# Patient Record
Sex: MALE | Race: WHITE | Employment: STUDENT | URBAN - NONMETROPOLITAN AREA
[De-identification: names, ages, dates, MRNs, and addresses within clinical notes are randomized per-mention and may not be internally consistent; named-entity substitution may affect disease eponyms.]

---

## 2019-02-17 ENCOUNTER — HOSPITAL ENCOUNTER (EMERGENCY)
Facility: OTHER | Age: 29
Discharge: HOME | End: 2019-02-17
Attending: PHYSICIAN ASSISTANT | Admitting: PHYSICIAN ASSISTANT
Payer: COMMERCIAL

## 2019-02-17 VITALS
RESPIRATION RATE: 16 BRPM | WEIGHT: 160 LBS | SYSTOLIC BLOOD PRESSURE: 134 MMHG | BODY MASS INDEX: 21.11 KG/M2 | TEMPERATURE: 98.3 F | DIASTOLIC BLOOD PRESSURE: 91 MMHG | OXYGEN SATURATION: 99 %

## 2019-02-17 DIAGNOSIS — K04.7 DENTAL ABSCESS: ICD-10-CM

## 2019-02-17 PROCEDURE — 41800 DRAINAGE OF GUM LESION: CPT | Mod: Z6 | Performed by: PHYSICIAN ASSISTANT

## 2019-02-17 PROCEDURE — 99282 EMERGENCY DEPT VISIT SF MDM: CPT | Mod: 25 | Performed by: PHYSICIAN ASSISTANT

## 2019-02-17 PROCEDURE — 25000125 ZZHC RX 250: Performed by: PHYSICIAN ASSISTANT

## 2019-02-17 PROCEDURE — 96372 THER/PROPH/DIAG INJ SC/IM: CPT | Mod: XU | Performed by: PHYSICIAN ASSISTANT

## 2019-02-17 PROCEDURE — 25000128 H RX IP 250 OP 636: Performed by: PHYSICIAN ASSISTANT

## 2019-02-17 PROCEDURE — 99284 EMERGENCY DEPT VISIT MOD MDM: CPT | Mod: 25 | Performed by: PHYSICIAN ASSISTANT

## 2019-02-17 PROCEDURE — 25000132 ZZH RX MED GY IP 250 OP 250 PS 637: Performed by: PHYSICIAN ASSISTANT

## 2019-02-17 PROCEDURE — 41800 DRAINAGE OF GUM LESION: CPT | Performed by: PHYSICIAN ASSISTANT

## 2019-02-17 RX ORDER — KETOROLAC TROMETHAMINE 30 MG/ML
30 INJECTION, SOLUTION INTRAMUSCULAR; INTRAVENOUS ONCE
Status: COMPLETED | OUTPATIENT
Start: 2019-02-17 | End: 2019-02-17

## 2019-02-17 RX ORDER — CLINDAMYCIN HCL 300 MG
300 CAPSULE ORAL 4 TIMES DAILY
Qty: 40 CAPSULE | Refills: 0 | Status: SHIPPED | OUTPATIENT
Start: 2019-02-17 | End: 2019-07-12

## 2019-02-17 RX ORDER — CLINDAMYCIN HCL 150 MG
450 CAPSULE ORAL ONCE
Status: COMPLETED | OUTPATIENT
Start: 2019-02-17 | End: 2019-02-17

## 2019-02-17 RX ADMIN — CLINDAMYCIN HYDROCHLORIDE 450 MG: 150 CAPSULE ORAL at 22:24

## 2019-02-17 RX ADMIN — LIDOCAINE HYDROCHLORIDE 10 ML: 10 INJECTION, SOLUTION EPIDURAL; INFILTRATION; INTRACAUDAL; PERINEURAL at 22:01

## 2019-02-17 RX ADMIN — KETOROLAC TROMETHAMINE 30 MG: 30 INJECTION, SOLUTION INTRAMUSCULAR at 22:25

## 2019-02-17 NOTE — ED AVS SNAPSHOT
[facility]  [address]  [place] [address]  Phone:  [phone]  Fax:  [phone]                                    Yuniel Palm   MRN: [id number]    Department:  [facility]   Date of Visit:  2/17/2019           After Visit Summary Signature Page    I have received my discharge instructions, and my questions have been answered. I have discussed any challenges I see with this plan with the nurse or doctor.    ..........................................................................................................................................  Patient/Patient Representative Signature      ..........................................................................................................................................  Patient Representative Print Name and Relationship to Patient    ..................................................               ................................................  Date                                   Time    ..........................................................................................................................................  Reviewed by Signature/Title    ...................................................              ..............................................  Date                                               Time          22EPIC Rev 08/18

## 2019-02-18 NOTE — ED TRIAGE NOTES
"Pt arrives to the ED via private car.  Pt states that his tooth was bothering him today when waking him up from a nap.  Pt states his back right molar was bothering him and there was a \"pus pocket\" in between his tooth and gum that moved around.  Pt states he took a clean nail and poked the pus pocket and white/yellow and a little bit of blood was in there.  Pt states his pain is a 10/10 right now.  "

## 2019-02-18 NOTE — DISCHARGE INSTRUCTIONS
Get plenty of fluids and rest.  Take your medication as prescribed.  You can take Tylenol ibuprofen as needed for discomfort.  Call make an appointment with a dentist in the morning.  Return to the ED if your symptoms are worsening such as increased swelling, difficulty swallowing liquids or your own saliva.

## 2019-02-22 ASSESSMENT — ENCOUNTER SYMPTOMS
BACK PAIN: 0
SHORTNESS OF BREATH: 0
ABDOMINAL PAIN: 0
ADENOPATHY: 0
FEVER: 0
HEMATURIA: 0
BRUISES/BLEEDS EASILY: 0
CONFUSION: 0
CHEST TIGHTNESS: 0
WOUND: 0
CHILLS: 0

## 2019-02-22 NOTE — ED PROVIDER NOTES
History   No chief complaint on file.    HPI  Yuniel Palm is a 28 year old male who presents to the ED with a chief complaint of a dental problem. After taking a nap this afternoon pt reports having a pus pocket around his right molar. Pt popped it open with a nail with yellow discharge. Pain is 10/10. No airway/respiratory distress.    Allergies:  Allergies   Allergen Reactions     Penicillins Hives       Problem List:    There are no active problems to display for this patient.       Past Medical History:    History reviewed. No pertinent past medical history.    Past Surgical History:    Past Surgical History:   Procedure Laterality Date     APPENDECTOMY  1999       Family History:    History reviewed. No pertinent family history.    Social History:  Marital Status:  Single [1]  Social History     Tobacco Use     Smoking status: Current Every Day Smoker     Packs/day: 0.25     Smokeless tobacco: Never Used   Substance Use Topics     Alcohol use: Yes     Drug use: No        Medications:      clindamycin (CLEOCIN) 300 MG capsule         Review of Systems   Constitutional: Negative for chills and fever.   HENT: Positive for dental problem. Negative for congestion.    Eyes: Negative for visual disturbance.   Respiratory: Negative for chest tightness and shortness of breath.    Cardiovascular: Negative for chest pain.   Gastrointestinal: Negative for abdominal pain.   Genitourinary: Negative for hematuria.   Musculoskeletal: Negative for back pain.   Skin: Negative for rash and wound.   Neurological: Negative for syncope.   Hematological: Negative for adenopathy. Does not bruise/bleed easily.   Psychiatric/Behavioral: Negative for confusion.       Physical Exam   BP: (!) 134/91  Heart Rate: 83  Temp: 98.3  F (36.8  C)  Resp: 16  Weight: 72.6 kg (160 lb)  SpO2: 99 %      Physical Exam   Constitutional: He appears well-developed and well-nourished. No distress.   HENT:   Head: Normocephalic and atraumatic.    Swelling on gum line right lower   Eyes: Conjunctivae are normal. No scleral icterus.   Neck: Neck supple.   Cardiovascular: Normal rate and regular rhythm.   Pulmonary/Chest: Effort normal.   Abdominal: Soft. There is no tenderness.   Musculoskeletal: He exhibits no deformity.   Lymphadenopathy:     He has no cervical adenopathy.   Neurological: He is alert.   Skin: Skin is warm and dry. No rash noted. He is not diaphoretic.   Psychiatric: He has a normal mood and affect.       ED Course        Procedures               Critical Care time:  none               No results found for this or any previous visit (from the past 24 hour(s)).    Medications   lidocaine 1 % 10 mL (10 mLs Other Given by Other Clinician 2/17/19 2201)   ketorolac (TORADOL) injection 30 mg (30 mg Intramuscular Given 2/17/19 2225)   clindamycin (CLEOCIN) capsule 450 mg (450 mg Oral Given 2/17/19 2224)       Assessments & Plan (with Medical Decision Making)   Pt nontoxic, in NAD. No signs of airway compromise. Poor dentition. Abscess seen right lower gum line. Inferior alveolar block placed, I&D. Purulent drainage, pt tolerated well. Pt placed on clindamycin, given toradol, f/u with dentist. Strict return precautions given, pt understood, and was discharged.     Juanjo Fisher PA-C    I have reviewed the nursing notes.    I have reviewed the findings, diagnosis, plan and need for follow up with the patient.          Medication List      Started    clindamycin 300 MG capsule  Commonly known as:  CLEOCIN  300 mg, Oral, 4 TIMES DAILY            Final diagnoses:   Dental abscess       2/17/2019   [facility]     Juanjo Fisher PA  02/22/19 8831

## 2019-07-12 ENCOUNTER — OFFICE VISIT (OUTPATIENT)
Dept: FAMILY MEDICINE | Facility: OTHER | Age: 29
End: 2019-07-12
Attending: NURSE PRACTITIONER
Payer: MEDICAID

## 2019-07-12 VITALS
HEIGHT: 72 IN | BODY MASS INDEX: 23.89 KG/M2 | WEIGHT: 176.4 LBS | TEMPERATURE: 98.4 F | HEART RATE: 86 BPM | RESPIRATION RATE: 18 BRPM | SYSTOLIC BLOOD PRESSURE: 122 MMHG | DIASTOLIC BLOOD PRESSURE: 86 MMHG | OXYGEN SATURATION: 98 %

## 2019-07-12 DIAGNOSIS — F98.8 ATTENTION DEFICIT DISORDER, UNSPECIFIED HYPERACTIVITY PRESENCE: ICD-10-CM

## 2019-07-12 DIAGNOSIS — F19.10 IV DRUG ABUSE (H): ICD-10-CM

## 2019-07-12 DIAGNOSIS — B18.2 CHRONIC HEPATITIS C WITHOUT HEPATIC COMA (H): ICD-10-CM

## 2019-07-12 DIAGNOSIS — Z00.00 ROUTINE GENERAL MEDICAL EXAMINATION AT A HEALTH CARE FACILITY: Primary | ICD-10-CM

## 2019-07-12 DIAGNOSIS — F10.10 ALCOHOL ABUSE: ICD-10-CM

## 2019-07-12 DIAGNOSIS — F33.2 SEVERE EPISODE OF RECURRENT MAJOR DEPRESSIVE DISORDER, WITHOUT PSYCHOTIC FEATURES (H): ICD-10-CM

## 2019-07-12 DIAGNOSIS — F41.1 GAD (GENERALIZED ANXIETY DISORDER): ICD-10-CM

## 2019-07-12 DIAGNOSIS — F17.200 TOBACCO DEPENDENCE: ICD-10-CM

## 2019-07-12 PROBLEM — R79.82 ELEVATED C-REACTIVE PROTEIN (CRP): Status: ACTIVE | Noted: 2018-11-08

## 2019-07-12 PROBLEM — R74.01 TRANSAMINITIS: Status: ACTIVE | Noted: 2018-11-08

## 2019-07-12 PROBLEM — R00.0 TACHYCARDIA: Status: ACTIVE | Noted: 2018-11-08

## 2019-07-12 PROBLEM — M25.461 PAIN AND SWELLING OF RIGHT KNEE: Status: ACTIVE | Noted: 2018-11-08

## 2019-07-12 PROBLEM — M25.561 PAIN AND SWELLING OF RIGHT KNEE: Status: ACTIVE | Noted: 2018-11-08

## 2019-07-12 PROCEDURE — G0463 HOSPITAL OUTPT CLINIC VISIT: HCPCS

## 2019-07-12 PROCEDURE — 99395 PREV VISIT EST AGE 18-39: CPT | Performed by: NURSE PRACTITIONER

## 2019-07-12 RX ORDER — CLINDAMYCIN HCL 300 MG
CAPSULE ORAL
Refills: 0 | COMMUNITY
Start: 2019-07-10 | End: 2024-09-23

## 2019-07-12 ASSESSMENT — MIFFLIN-ST. JEOR: SCORE: 1805.21

## 2019-07-12 ASSESSMENT — ANXIETY QUESTIONNAIRES
1. FEELING NERVOUS, ANXIOUS, OR ON EDGE: NOT AT ALL
6. BECOMING EASILY ANNOYED OR IRRITABLE: NOT AT ALL
3. WORRYING TOO MUCH ABOUT DIFFERENT THINGS: NOT AT ALL
5. BEING SO RESTLESS THAT IT IS HARD TO SIT STILL: NOT AT ALL
GAD7 TOTAL SCORE: 0
2. NOT BEING ABLE TO STOP OR CONTROL WORRYING: NOT AT ALL
7. FEELING AFRAID AS IF SOMETHING AWFUL MIGHT HAPPEN: NOT AT ALL
IF YOU CHECKED OFF ANY PROBLEMS ON THIS QUESTIONNAIRE, HOW DIFFICULT HAVE THESE PROBLEMS MADE IT FOR YOU TO DO YOUR WORK, TAKE CARE OF THINGS AT HOME, OR GET ALONG WITH OTHER PEOPLE: NOT DIFFICULT AT ALL

## 2019-07-12 ASSESSMENT — PATIENT HEALTH QUESTIONNAIRE - PHQ9: 5. POOR APPETITE OR OVEREATING: NOT AT ALL

## 2019-07-12 ASSESSMENT — PAIN SCALES - GENERAL: PAINLEVEL: NO PAIN (0)

## 2019-07-12 NOTE — PROGRESS NOTES
SUBJECTIVE:   CC: Yuniel Palm is an 29 year old male who presents for preventive health visit.     Healthy Habits:    Do you get at least three servings of calcium containing foods daily (dairy, green leafy vegetables, etc.)? yes    Amount of exercise or daily activities, outside of work: a lot; walks about 3 miles a day, works out for 45 minutes with arms and core strengthening through lifting    Problems taking medications regularly No    Medication side effects: No    Have you had an eye exam in the past two years? Yes  - yesterday    Do you see a dentist twice per year? Saw yesterday, started on antibiotics for infection    Do you have sleep apnea, excessive snoring or daytime drowsiness?no      Substance abuse  Currently residing at [facility] for court ordered alcohol treatment. Came on 6/11/19, was supposed to be leaving today though is working on getting out this weekend. Has been in treatment for alcohol in 2017, relapsed in 2018 as more of a weekend issue. Would typically drink a pint on a Friday, wake up hung over on Saturday and then plan on going out again on Sunday. Typically would be only when SO was leaving town to visit family. Is a probation violation, which then required him to return to treatment. 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 Did have positive Hep C antibody in the past, Hep C RNA was negative.     Today's PHQ-2 Score:   PHQ-2 ( 1999 Pfizer) 7/12/2019   Q1: Little interest or pleasure in doing things 0   Q2: Feeling down, depressed or hopeless 0   PHQ-2 Score 0       Abuse: Current or Past(Physical, Sexual or Emotional)- No  Do you feel safe in your environment? Yes    Social History     Tobacco Use     Smoking status: Current Every Day Smoker     Packs/day: 0.25     Years: 5.00     Pack years: 1.25     Smokeless tobacco: Never Used   Substance Use Topics     Alcohol use: Yes     Comment: 1 pint liquor weekly; sober since  February 20th 2019     If you drink alcohol do you typically have >3 drinks per day or >7 drinks per week? No                      Last PSA: No results found for: PSA    Reviewed orders with patient. Reviewed health maintenance and updated orders accordingly - Yes  Labs reviewed in EPIC  BP Readings from Last 3 Encounters:   07/12/19 122/86   02/17/19 (!) 134/91   01/22/14 120/78    Wt Readings from Last 3 Encounters:   07/12/19 80 kg (176 lb 6.4 oz)   02/17/19 72.6 kg (160 lb)   01/22/14 74.8 kg (165 lb)                  Patient Active Problem List   Diagnosis     Attention deficit disorder     Chronic hepatitis C without hepatic coma (H)     Elevated C-reactive protein (CRP)     AYLIN (generalized anxiety disorder)     IV drug abuse (H)     Pain and swelling of right knee     Severe episode of recurrent major depressive disorder, without psychotic features (H)     Tachycardia     Tobacco dependence     Transaminitis     Past Surgical History:   Procedure Laterality Date     APPENDECTOMY  1999       Social History     Tobacco Use     Smoking status: Current Every Day Smoker     Packs/day: 0.25     Years: 5.00     Pack years: 1.25     Smokeless tobacco: Never Used   Substance Use Topics     Alcohol use: Yes     Comment: 1 pint liquor weekly; sober since February 20th 2019     Family History   Problem Relation Age of Onset     Diabetes Mother      Heart Disease Mother      Hypertension Mother      Hyperlipidemia Mother      Kidney Disease Mother      Depression Mother      Substance Abuse Mother      Dementia Mother      Unknown/Adopted Father      Unknown/Adopted Maternal Grandmother          Current Outpatient Medications   Medication Sig Dispense Refill     clindamycin (CLEOCIN) 300 MG capsule TAKE 1 CAPSULE BY MOUTH THREE TIMES DAILY TO COMPLETION  0     Allergies   Allergen Reactions     Penicillins Hives       Reviewed and updated as needed this visit by clinical staff  Tobacco  Allergies  Meds  Problems  Med  "Hx  Surg Hx  Fam Hx  Soc Hx          Reviewed and updated as needed this visit by Provider  Tobacco  Allergies  Meds  Med Hx  Surg Hx  Fam Hx  Soc Hx           ROS:  CONSTITUTIONAL: NEGATIVE for fever, chills, change in weight  INTEGUMENTARY/SKIN: NEGATIVE for worrisome rashes, moles or lesions  EYES: NEGATIVE for vision changes or irritation  ENT: POSITIVE for tooth pain  RESP: NEGATIVE for significant cough or SOB  CV: NEGATIVE for chest pain, palpitations or peripheral edema  GI: NEGATIVE for nausea, abdominal pain, heartburn, or change in bowel habits   male: negative for dysuria, hematuria, decreased urinary stream, erectile dysfunction, urethral discharge  MUSCULOSKELETAL: NEGATIVE for significant arthralgias or myalgia  NEURO: NEGATIVE for weakness, dizziness or paresthesias  ENDOCRINE: NEGATIVE for temperature intolerance, skin/hair changes  HEME/ALLERGY/IMMUNE: NEGATIVE for bleeding problems  PSYCHIATRIC: NEGATIVE for changes in mood or affect    OBJECTIVE:   /86   Pulse 86   Temp 98.4  F (36.9  C) (Temporal)   Resp 18   Ht 1.816 m (5' 11.5\")   Wt 80 kg (176 lb 6.4 oz)   SpO2 98%   BMI 24.26 kg/m    EXAM:  GENERAL: healthy, alert and no distress  EYES: Eyes grossly normal to inspection, PERRL and conjunctivae and sclerae normal  HENT: ear canals and TM's normal, nose and mouth without ulcers or lesions  NECK: no adenopathy, no asymmetry, masses, or scars and thyroid normal to palpation  RESP: lungs clear to auscultation - no rales, rhonchi or wheezes  CV: regular rate and rhythm, normal S1 S2, no S3 or S4, no murmur, click or rub, no peripheral edema and peripheral pulses strong  ABDOMEN: soft, nontender, no hepatosplenomegaly, no masses and bowel sounds normal  MS: no gross musculoskeletal defects noted, no edema  SKIN: no suspicious lesions or rashes  NEURO: Normal strength and tone, mentation intact and speech normal  PSYCH: mentation appears normal, affect " "normal/bright    Diagnostic Test Results:  Labs reviewed in Epic  Declined today, is having some done in the next day or two at new living facility    ASSESSMENT/PLAN:   1. Routine general medical examination at a health care facility  2. Alcohol abuse  3.  IV drug abuse (H)  4. Chronic hepatitis C without hepatic coma (H)  Currently remains at [facility] for court ordered inpatient alcohol treatment, though is moving tomorrow as he has already completed stay here and has done well. Has no concerns for ongoing treatment at this time, is looking forward to settling back at home with SO eventually. Has been sober from IV meth use since 2017 and doing well with this. Declined labs today as he is moving to a new facility and will have to have labs completed there.     5. Severe episode of recurrent major depressive disorder, without psychotic features (H)  6. AYLIN (generalized anxiety disorder)  7. Attention deficit disorder, unspecified hyperactivity presence  Ongoing, though stable without medications, doing well.     8. Tobacco dependence  Ongoing, not interested in tobacco cessation at this time.       COUNSELING:  Reviewed preventive health counseling, as reflected in patient instructions       Regular exercise       Healthy diet/nutrition       Vision screening       Hearing screening       Family planning       Safe sex practices/STD prevention       Consider Hep C screening for patients born between 1945 and 1965       HIV screeninx in teen years, 1x in adult years, and at intervals if high risk    Estimated body mass index is 24.26 kg/m  as calculated from the following:    Height as of this encounter: 1.816 m (5' 11.5\").    Weight as of this encounter: 80 kg (176 lb 6.4 oz).         reports that he has been smoking.  He has a 1.25 pack-year smoking history. He has never used smokeless tobacco.  Tobacco Cessation Action Plan: Information offered: Patient not interested at this " time    Counseling Resources:  ATP IV Guidelines  Pooled Cohorts Equation Calculator  FRAX Risk Assessment  ICSI Preventive Guidelines  Dietary Guidelines for Americans, 2010  USDA's MyPlate  ASA Prophylaxis  Lung CA Screening    Liyah Rodas NP  [facility]

## 2019-07-12 NOTE — NURSING NOTE
"Chief Complaint   Patient presents with     Physical     [place] intake px     Currently in treatment for Alcohol and Methamphetamine use.    Initial /86   Pulse 86   Temp 98.4  F (36.9  C) (Temporal)   Resp 18   Ht 1.816 m (5' 11.5\")   Wt 80 kg (176 lb 6.4 oz)   SpO2 98%   BMI 24.26 kg/m   Estimated body mass index is 24.26 kg/m  as calculated from the following:    Height as of this encounter: 1.816 m (5' 11.5\").    Weight as of this encounter: 80 kg (176 lb 6.4 oz).    Medication Reconciliation: complete      Norma J. Gosselin, LPN  "

## 2019-07-13 ASSESSMENT — ANXIETY QUESTIONNAIRES: GAD7 TOTAL SCORE: 0

## 2019-07-15 PROBLEM — F10.10 ALCOHOL ABUSE: Status: ACTIVE | Noted: 2019-07-12

## 2020-05-20 ENCOUNTER — PATIENT OUTREACH (OUTPATIENT)
Dept: CARE COORDINATION | Facility: CLINIC | Age: 30
End: 2020-05-20

## 2020-05-20 NOTE — PROGRESS NOTES
Clinic Care Coordination Contact  [facility]       Clinical Data: Care Coordinator Outreach  Outreach attempted x 1. No working personal phone number, may be in inpatient treatment right now. Maritza Gibbs RN on 5/20/2020 at 11:30 AM

## 2024-09-23 ENCOUNTER — HOSPITAL ENCOUNTER (EMERGENCY)
Facility: HOSPITAL | Age: 34
Discharge: HOME | End: 2024-09-23
Attending: NURSE PRACTITIONER
Payer: COMMERCIAL

## 2024-09-23 VITALS
DIASTOLIC BLOOD PRESSURE: 87 MMHG | SYSTOLIC BLOOD PRESSURE: 136 MMHG | HEART RATE: 98 BPM | TEMPERATURE: 99.2 F | RESPIRATION RATE: 18 BRPM | OXYGEN SATURATION: 98 %

## 2024-09-23 DIAGNOSIS — L23.7 CONTACT DERMATITIS DUE TO POISON IVY: Primary | ICD-10-CM

## 2024-09-23 PROCEDURE — G0463 HOSPITAL OUTPT CLINIC VISIT: HCPCS

## 2024-09-23 PROCEDURE — 99213 OFFICE O/P EST LOW 20 MIN: CPT | Performed by: NURSE PRACTITIONER

## 2024-09-23 PROCEDURE — 250N000012 HC RX MED GY IP 250 OP 636 PS 637: Performed by: NURSE PRACTITIONER

## 2024-09-23 RX ORDER — MUPIROCIN 20 MG/G
OINTMENT TOPICAL 3 TIMES DAILY
Qty: 60 G | Refills: 0 | Status: SHIPPED | OUTPATIENT
Start: 2024-09-23

## 2024-09-23 RX ORDER — PREDNISONE 20 MG/1
TABLET ORAL
Qty: 18 TABLET | Refills: 0 | Status: SHIPPED | OUTPATIENT
Start: 2024-09-23 | End: 2024-10-08

## 2024-09-23 RX ORDER — PREDNISONE 20 MG/1
60 TABLET ORAL ONCE
Status: COMPLETED | OUTPATIENT
Start: 2024-09-23 | End: 2024-09-23

## 2024-09-23 RX ADMIN — PREDNISONE 60 MG: 20 TABLET ORAL at 21:09

## 2024-09-23 ASSESSMENT — COLUMBIA-SUICIDE SEVERITY RATING SCALE - C-SSRS
6. HAVE YOU EVER DONE ANYTHING, STARTED TO DO ANYTHING, OR PREPARED TO DO ANYTHING TO END YOUR LIFE?: NO
2. HAVE YOU ACTUALLY HAD ANY THOUGHTS OF KILLING YOURSELF IN THE PAST MONTH?: NO
1. IN THE PAST MONTH, HAVE YOU WISHED YOU WERE DEAD OR WISHED YOU COULD GO TO SLEEP AND NOT WAKE UP?: NO

## 2024-09-23 ASSESSMENT — ENCOUNTER SYMPTOMS
JOINT SWELLING: 1
COLOR CHANGE: 1

## 2024-09-23 ASSESSMENT — ACTIVITIES OF DAILY LIVING (ADL): ADLS_ACUITY_SCORE: 35

## 2024-09-24 NOTE — DISCHARGE INSTRUCTIONS
Take the prednisone as prescribed until it is finished.    Clean the wound with mild soap and water, apply the mupirocin ointment over it.    You can use calamine lotion or cool compresses to help with the itching.  Follow-up with your primary doctor if no improvement in symptoms.  Return to urgent care or emergency room for any worsening or concerning symptoms.

## 2024-09-24 NOTE — ED TRIAGE NOTES
Pt presents today with c/o left ankle wound. Was hiking, thinks this is poison ivy or oak. PT has blistering area, red scabs, and drainage from area. Ankle is inflamed and swollen.

## 2024-09-24 NOTE — ED PROVIDER NOTES
History     Chief Complaint   Patient presents with    Wound Check     HPI  Yuniel Palm is a 34 year old male who presents to urgent care for evaluation.  3 days ago patient states that he went on a field trip and thinks he got exposed to poison ivy/poison oak.  He tells me that the next day when he took a shower he had a burning sensation when he was cleaning his left ankle.  He has now since developed a rash with some drainage over the area.  Notes that the area is itchy and is trying his hardest not to scratch it.  Reports swelling to his left ankle and some redness when his feet are dangling.  This resolves when he elevates his legs or when he sleeps at night.  No fever or chills.  No other concerning symptoms.      Allergies:  Allergies   Allergen Reactions    Pcn [Penicillins] Hives       Problem List:    Patient Active Problem List    Diagnosis Date Noted    Alcohol abuse 07/12/2019     Priority: Medium    Elevated C-reactive protein (CRP) 11/08/2018     Priority: Medium    Pain and swelling of right knee 11/08/2018     Priority: Medium    Tachycardia 11/08/2018     Priority: Medium    Tobacco dependence 11/08/2018     Priority: Medium    Transaminitis 11/08/2018     Priority: Medium    AYLIN (generalized anxiety disorder) 09/11/2018     Priority: Medium    Chronic hepatitis C without hepatic coma (H) 01/20/2017     Priority: Medium    Severe episode of recurrent major depressive disorder, without psychotic features (H) 11/11/2016     Priority: Medium    IV drug abuse (H) 10/22/2014     Priority: Medium    Attention deficit disorder 10/19/2006     Priority: Medium     Overview:   IMO Update          Past Medical History:    Past Medical History:   Diagnosis Date    Depressive disorder        Past Surgical History:    Past Surgical History:   Procedure Laterality Date    APPENDECTOMY  1999       Family History:    Family History   Problem Relation Age of Onset    Diabetes Mother     Heart Disease Mother      Hypertension Mother     Hyperlipidemia Mother     Kidney Disease Mother     Depression Mother     Substance Abuse Mother     Dementia Mother     Unknown/Adopted Father     Unknown/Adopted Maternal Grandmother        Social History:  Marital Status:  Single [1]  Social History     Tobacco Use    Smoking status: Every Day     Current packs/day: 0.25     Average packs/day: 0.3 packs/day for 5.0 years (1.3 ttl pk-yrs)     Types: Cigarettes    Smokeless tobacco: Never   Substance Use Topics    Alcohol use: Yes     Comment: 1 pint liquor weekly; sober since February 20th 2019    Drug use: Not Currently     Types: Methamphetamines     Comment: currently in treatment 7/12/19; clean and sober from meth as of November 11 2018        Medications:    mupirocin (BACTROBAN) 2 % external ointment  predniSONE (DELTASONE) 20 MG tablet          Review of Systems   Musculoskeletal:  Positive for joint swelling.   Skin:  Positive for color change and rash.   All other systems reviewed and are negative.      Physical Exam   BP: 136/87  Pulse: 98  Temp: 99.2  F (37.3  C)  Resp: 18  SpO2: 98 %      Physical Exam  Vitals and nursing note reviewed.   Constitutional:       General: He is not in acute distress.     Appearance: He is well-developed. He is not diaphoretic.   HENT:      Head: Normocephalic and atraumatic.   Eyes:      Pupils: Pupils are equal, round, and reactive to light.   Cardiovascular:      Rate and Rhythm: Normal rate.      Pulses:           Dorsalis pedis pulses are 2+ on the left side.   Pulmonary:      Effort: Pulmonary effort is normal.   Musculoskeletal:      Cervical back: Normal range of motion and neck supple.        Feet:    Skin:     General: Skin is warm and dry.      Coloration: Skin is not pale.      Findings: Rash present.      Comments: Patient with a erythematous rash to medial left ankle.  There is some honey crusted drainage over some of the lesions as well.  Blanchable erythema appreciated.  Swelling  to the left ankle.  Full range of motion to the left ankle.  No fluctuance appreciated.  Distal pulses intact.   Neurological:      Mental Status: He is alert and oriented to person, place, and time.         ED Course        Procedures         No results found for this or any previous visit (from the past 24 hour(s)).    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  On evaluation he does have a rash consistent with contact dermatitis secondary to poison ivy to medial left ankle.  Also noted to have some honey crusted drainage over some of the lesions.  Erythema and swelling improved with elevation of leg.'s report.  He is afebrile.  He has full range of motion to his left ankle.  Distal pulses intact.    Discussed all findings with patient.  He will be treated with a prednisone taper for contact dermatitis due to poison ivy/poison oak.  Also opted to prescribe mupirocin ointment that he can apply over the lesions out of concern of impetigo.  Patient advised to continue monitoring his symptoms and if they worsen or he develops any other concerning symptoms not limited to fever, decreased range of motion to ankle or worsening redness he should return here for evaluation.  I have reviewed the nursing notes.    I have reviewed the findings, diagnosis, plan and need for follow up with the patient.  This document was prepared using a combination of typing and voice generated software.  While every attempt was made for accuracy, spelling and grammatical errors may exist.         New Prescriptions    MUPIROCIN (BACTROBAN) 2 % EXTERNAL OINTMENT    Apply topically 3 times daily.    PREDNISONE (DELTASONE) 20 MG TABLET    Take 2 tablets (40 mg) by mouth daily for 5 days, THEN 1 tablet (20 mg) daily for 5  days, THEN 0.5 tablets (10 mg) daily for 5 days.       Final diagnoses:   Contact dermatitis due to poison ivy       9/23/2024   [facility]       Mpofu, Prudence, CNP  09/25/24 09

## 2025-02-20 ENCOUNTER — TRANSFERRED RECORDS (OUTPATIENT)
Dept: HEALTH INFORMATION MANAGEMENT | Facility: CLINIC | Age: 35
End: 2025-02-20

## 2025-03-07 ENCOUNTER — MEDICAL CORRESPONDENCE (OUTPATIENT)
Dept: HEALTH INFORMATION MANAGEMENT | Facility: CLINIC | Age: 35
End: 2025-03-07

## 2025-04-07 ENCOUNTER — MEDICAL CORRESPONDENCE (OUTPATIENT)
Dept: HEALTH INFORMATION MANAGEMENT | Facility: CLINIC | Age: 35
End: 2025-04-07

## 2025-04-10 ENCOUNTER — TRANSFERRED RECORDS (OUTPATIENT)
Dept: HEALTH INFORMATION MANAGEMENT | Facility: CLINIC | Age: 35
End: 2025-04-10

## 2025-05-29 ENCOUNTER — TRANSFERRED RECORDS (OUTPATIENT)
Dept: HEALTH INFORMATION MANAGEMENT | Facility: CLINIC | Age: 35
End: 2025-05-29

## 2025-07-14 ENCOUNTER — PATIENT OUTREACH (OUTPATIENT)
Dept: CARE COORDINATION | Facility: OTHER | Age: 35
End: 2025-07-14

## 2025-07-14 NOTE — PROGRESS NOTES
Clinic Care Coordination Contact  Care Team Conversations    RN CC received phone call from Christina - Opioid Release Planner - from DOC this date.  Yuniel is being released from [facility] on 7/28/2025.  He is being released on Suboxone and needs a continuation of care appointment scheduled.  RN CC did schedule him an appointment with Dr. Richmond, July 29, 2025, arrive at 10:45AM.  He will go to lab first for labs - blood work and UDS.  Provided Christina with RN CCs direct phone number to give to Yuniel on release - in case he has questions/concerns.  All questions answered.     Carey Yo RN-BSN, [facility] Care Coordinator  [phone]

## 2025-07-24 ENCOUNTER — TRANSFERRED RECORDS (OUTPATIENT)
Dept: HEALTH INFORMATION MANAGEMENT | Facility: CLINIC | Age: 35
End: 2025-07-24
Payer: MEDICAID

## 2025-07-25 NOTE — PROGRESS NOTES
Assessment & Plan     Opioid use disorder  Obtained baseline history as below, MONIKA for outside records completed today.  Incarcerated and sober for the past 10 months, released from [facility] yesterday and has been weaned to low-dose buprenorphine.  Working hard on getting self set up for success.  Still gathering information and learning patient, but will assume Suboxone management for harm reduction, particularly during this critical time, does need to have close clinic follow-up for now.  - Visit in conjunction with RN CC; appreciate assistance  - MAT contract signed 7/29/2025  - PDMP reviewed  - Urine Drug Screen Buprenorphine Urine Qualitative [id number], Ethanol Urine Qualitative [id number], Oxycodone Urine Qualitative [id number], Methadone Urine Qualitative [id number], Creatinine Urine Random [id number]  - buprenorphine (SUBUTEX) 8 MG SUBL sublingual tablet; Place 1 tablet (8 mg) under the tongue 2 times daily.x  - Follow-up 8/5/2025    I spent a total of 37 minutes on the day of the visit.   Time spent by me today doing chart review, history and exam, documentation and further activities per the note    The longitudinal plan of care for the diagnosis(es)/condition(s) as documented were addressed during this visit. Due to the added complexity in care, I will continue to support Yuniel Carteraulieu in the subsequent management and with ongoing continuity of care.    Follow-up 1 week.      Mikayla Brice is a 35 year old, presenting for the following health issues:  Medication Therapy Management      HPI      He is currently taking 4mg of buprenorphine daily.   Released from [facility] yesterday  Started on MOUD prior to his release - goal was to increase by 2mg every 2 days    Sheron/Keerthi Churchill -therapy/counseling   16mg daily - last 2 years     Current Narcotic Use/History:  Which opioid(s) are you currently using, that are not already on your med list?: Heroin -  a few times, Methadone   How do you use your  drug of choice? Oral  What is your estimated total dose (mg if pills, grams of heroin) per day? Not even 1 mg  When did you last use? 10 months ago   Have you ever tried to quit on your own? YES-   What have you done to try quiting in the past? Suboxone  What was the longest period of time you have been sober from opioids?: 10 months  When and how did you start using opioids? Was started on MOUD while incarcerated     Status Since Last Visit:  Have you used any opioids since your last visit?: no use since last visit  Do you feel that your dose of suboxone is too high or too low? Too low  Have there been cravings for opioids? No   Any withdrawal symptoms? None     Any side effects from the medication? None  Any alcohol use? None  Any other recreational drug use? Methamphetamine, Amphetamines - 10 months ago - meth was substance of choice   History of Hep C - treated with Mavyret - reinfected - appt with infectious disease in [place] tomorrow     Precipitating Factors:  Triggers have been: mild   Other Supports:  Do you attend counseling or meet with a therapist? Will be working with [place]  Do you attend NA or AA meetings? No  Do you have/meet with a sponsor? Band members  Family and support systems have been: Helpful   What other goals have you been working on (job, family, relationships, etc)? Released from [facility] yesterday - on parole - ankle monitor     Social History  Housing status: with mother  Employment status: waiting to hear back from [place] [address] [employer]   Relationship status: Single  Children: no children  Legal: Out on parole   Insurance needs: Up to date   Contact information up to date? Yes          7/29/2025     9:52 AM   PHQ-9 SCORE   PHQ-9 Total Score MyChart 5 (Mild depression)   PHQ-9 Total Score 5        Patient-reported           7/12/2019    10:58 AM   AYLIN-7 SCORE   Total Score 0     PDMP Review       None            Review of Systems  Constitutional, HEENT, cardiovascular, pulmonary, gi  and gu systems are negative, except as otherwise noted.      Objective    /80   Pulse 82   Temp 97.8  F (36.6  C) (Tympanic)   Resp 18   Ht 1.829 m (6')   Wt 83 kg (183 lb)   SpO2 97%   BMI 24.82 kg/m    Body mass index is 24.82 kg/m .  Physical Exam  Vitals reviewed.   Constitutional:       Appearance: Normal appearance.   HENT:      Head: Normocephalic and atraumatic.   Cardiovascular:      Rate and Rhythm: Normal rate and regular rhythm.      Heart sounds: No murmur heard.  Pulmonary:      Effort: Pulmonary effort is normal.      Breath sounds: Normal breath sounds. No stridor. No wheezing, rhonchi or rales.   Musculoskeletal:         General: Normal range of motion.   Skin:     General: Skin is warm and dry.   Neurological:      General: No focal deficit present.      Mental Status: He is alert and oriented to person, place, and time.   Psychiatric:         Mood and Affect: Mood normal.         Behavior: Behavior normal.         No results found for this or any previous visit (from the past 24 hours).        Signed Electronically by: Juan Richmond MD

## 2025-07-29 ENCOUNTER — OFFICE VISIT (OUTPATIENT)
Dept: FAMILY MEDICINE | Facility: OTHER | Age: 35
End: 2025-07-29
Attending: STUDENT IN AN ORGANIZED HEALTH CARE EDUCATION/TRAINING PROGRAM
Payer: MEDICAID

## 2025-07-29 ENCOUNTER — PATIENT OUTREACH (OUTPATIENT)
Dept: CARE COORDINATION | Facility: OTHER | Age: 35
End: 2025-07-29

## 2025-07-29 ENCOUNTER — TELEPHONE (OUTPATIENT)
Dept: FAMILY MEDICINE | Facility: OTHER | Age: 35
End: 2025-07-29

## 2025-07-29 VITALS
BODY MASS INDEX: 24.79 KG/M2 | RESPIRATION RATE: 18 BRPM | TEMPERATURE: 97.8 F | SYSTOLIC BLOOD PRESSURE: 130 MMHG | HEART RATE: 82 BPM | OXYGEN SATURATION: 97 % | HEIGHT: 72 IN | WEIGHT: 183 LBS | DIASTOLIC BLOOD PRESSURE: 80 MMHG

## 2025-07-29 DIAGNOSIS — F11.90 OPIOID USE DISORDER: Primary | ICD-10-CM

## 2025-07-29 PROBLEM — A64 STI (SEXUALLY TRANSMITTED INFECTION): Status: ACTIVE | Noted: 2025-07-22

## 2025-07-29 PROBLEM — L03.90 CELLULITIS: Status: ACTIVE | Noted: 2025-07-22

## 2025-07-29 PROBLEM — K13.0 ABSCESS OF LIP: Status: ACTIVE | Noted: 2025-07-22

## 2025-07-29 PROCEDURE — G0463 HOSPITAL OUTPT CLINIC VISIT: HCPCS

## 2025-07-29 RX ORDER — BUPRENORPHINE HYDROCHLORIDE AND NALOXONE HYDROCHLORIDE DIHYDRATE 8; 2 MG/1; MG/1
TABLET SUBLINGUAL
COMMUNITY
Start: 2024-10-27 | End: 2025-07-29

## 2025-07-29 RX ORDER — BUPRENORPHINE HYDROCHLORIDE AND NALOXONE HYDROCHLORIDE DIHYDRATE 8; 2 MG/1; MG/1
1 TABLET SUBLINGUAL 2 TIMES DAILY
Qty: 15 TABLET | Refills: 0 | Status: SHIPPED | OUTPATIENT
Start: 2025-07-29

## 2025-07-29 RX ORDER — BUPRENORPHINE 8 MG/1
8 TABLET SUBLINGUAL 2 TIMES DAILY
Qty: 15 TABLET | Refills: 0 | Status: SHIPPED | OUTPATIENT
Start: 2025-07-29

## 2025-07-29 ASSESSMENT — PATIENT HEALTH QUESTIONNAIRE - PHQ9
SUM OF ALL RESPONSES TO PHQ QUESTIONS 1-9: 5
10. IF YOU CHECKED OFF ANY PROBLEMS, HOW DIFFICULT HAVE THESE PROBLEMS MADE IT FOR YOU TO DO YOUR WORK, TAKE CARE OF THINGS AT HOME, OR GET ALONG WITH OTHER PEOPLE: NOT DIFFICULT AT ALL
SUM OF ALL RESPONSES TO PHQ QUESTIONS 1-9: 5

## 2025-07-29 ASSESSMENT — ACTIVITIES OF DAILY LIVING (ADL): DEPENDENT_IADLS:: INDEPENDENT

## 2025-07-29 NOTE — PROGRESS NOTES
Liyah calling from [facility] stating the patient does not have any pharmacy benefits at this time because the patient was incarcerated and they do not have this medication in stock and won't be there until tomorrow might want to considered another pharmacy if the patient wants this medication today.    Phone number for Liyah at [facility] [phone]

## 2025-07-29 NOTE — LETTER
Yuniel Palm  [id number]         July 29, 2025        Informed Consent for Treatment with Suboxone (Buprenorphine/Naloxone)    I understand that my provider is prescribing Suboxone (Buprenorphine/Naloxone) to assist in managing my opioid use disorder. This medication will be part of my overall recovery plan. Suboxone (Buprenorphine/Naloxone) is an FDA approved medication for treatment of people with opioid use disorder. Suboxone (Buprenorphine/Naloxone) comes in tablets or films that must be held under the tongue or between the gum and lip until dissolved completely as it will not be absorbed from the stomach if swallowed. Suboxone (Buprenorphine/Naloxone) can be used for detoxification or for maintenance therapy; or as long as medically necessary. Buprenorphine is a partial opioid; however, it does not activate the opioid receptors like heroin, fentanyl, or other full opioid agonists. It can result in physical dependence and should not be suddenly discontinued. Naloxone is a short-acting opioid blocker. If Suboxone (Buprenorphine/Naloxone) is dissolved and injected, smoked, or snorted, it may result in severe opioid withdrawal.  Some patients, if they suddenly discontinue Suboxone (Buprenorphine/Naloxone), may have opioid withdrawal symptoms. To minimize the possibility of opioid withdrawal, Suboxone (Buprenorphine/Naloxone) should be discontinued gradually, usually over several weeks and with provider assistance.     Individuals should be in mild to moderate withdrawal when started (induced) on Suboxone (Buprenorphine/Naloxone). If given too soon, it may cause precipitated withdrawal which could result in sudden and sometimes severe withdrawal symptoms. Ideally, the first dose should be under the guidance of a provider. Some patients may take several days to transition onto Suboxone (Buprenorphine/Naloxone). During that time, patients may have very mild withdrawal symptoms which can be minimized by other  medications prescribed by a provider. Once stabilized on Suboxone (Buprenorphine/Naloxone), other opioids will generally have much less effect. Attempts to override the Suboxone (Buprenorphine/Naloxone) by taking more opioids could result in an opioid overdose. Combining Suboxone (Buprenorphine/Naloxone) with alcohol, benzodiazepines, or other medications may also be hazardous and can result in respiratory depression or death.     Abstinence-based treatment is an option, however, data shows that medications for opioid use  disorder (MOUD) shows longer recovery and increased treatment retention and less relapse and death.    Another MOUD maintenance therapy is Methadone. Methadone is a full-agonist opioid which  binds to the receptor like heroin, fentanyl etc., but has a very long-half life so patients do not  get the same euphoria from it. Methadone for opioid use disorder can only be given in special  clinics called OTPs (historically called  methadone clinics ). If you would prefer Methadone, you  can be referred for those services.    Vivitrol, in the injectable long-acting formulation of naltrexone, is another MOUD. It completely  blocks the effects of opioids but has no opioid effects of its own. Vivitrol injections need to be  given every 4 weeks by a medical provider or clinic. You can receive this form of treatment in  our facility if requested.    The risks, benefits, and side effects of Suboxone (Buprenorphine/Naloxone) have been explained to me. I understand there are alternatives to Suboxone (Buprenorphine/Naloxone); however, currently, I am choosing to start Suboxone (Buprenorphine/Naloxone) as part of my treatment plan.      Suboxone (Buprenorphine/Naloxone) Treatment Agreement    This is an agreement between you and your provider about the safe and appropriate use of Suboxone (Buprenorphine/Naloxone) prescribed by your care team.    We are committed to collaborating with you in your efforts to get  better. To support you in this work, we will help you schedule regular office appointments for medicine refills. If we must cancel or change your appointment for any reason, we will make sure you have enough medicine to last until your next appointment.     As a Provider, I will:  Listen carefully to your concerns and treat you with respect.   Recommend a treatment plan that I believe is in your best interest. This plan may involve therapies other than Suboxone (Buprenorphine/Naloxone).   Talk with you often about the possible benefits, and the risk of harm of any medicine that we prescribe for you.   Provide a plan on how to taper (discontinue or go off) using this medicine if the decision is made to stop its use.    As a Patient, I understand that Suboxone (Buprenorphine/Naloxone):   Is a controlled substance prescribed by my care team to help me function or work and manage my condition(s).   Needs to be taken exactly as prescribed. Combining Suboxone (Buprenorphine/Naloxone) with certain medicines or chemicals (such as illegal drugs, sedatives, sleeping pills, and benzodiazepines) can be dangerous or even fatal. If I stop Suboxone (Buprenorphine/Naloxone) suddenly, I may have severe withdrawal symptoms.    The risks, benefits and side effects of these medicine(s) were explained to me. I agree that:  I will take part in other treatments as advised by my care team. This may be psychiatry or counseling, physical therapy, behavioral therapy, group treatment or a referral to a specialist.     I will keep all my appointments. I understand that this is part of the monitoring of Suboxone (Buprenorphine/Naloxone). My care team may require an office visit for EVERY refill. If I miss appointments or do not follow instructions, my care team may stop my medicine.    I will be respectful and considerate to all staff and providers at the clinic. Rude or aggressive behavior to staff including providers, nursing staff, front  desk, and any others will not be tolerated.    I will be honest with my care team.     I will take my medicines as prescribed. I will not change the dose or schedule unless my care team tells me to. There will be no refills if I run out early.     I may be asked to come to the clinic and complete a urine drug test or complete a film/pill count at any time. If I do not give a urine sample or participate in a film/pill count, my care team may stop my medicine.    It is up to me to make sure that I do not run out of my medicines on weekends or holidays. If my care team is willing to refill my Suboxone (Buprenorphine/Naloxone) prescription without a visit, I must request refills only during office hours. Refills may take up to three business days to process. I will use one pharmacy to fill all my Suboxone (Buprenorphine/Naloxone) and other controlled substance prescriptions. I will notify the clinic about any changes to my insurance or medication availability.    I am responsible for my prescriptions. If the medicine/prescription is lost, stolen, or destroyed, it will not be replaced. I will store my medication in a secure location away from children. I also agree not to share controlled substance medicines with anyone.    I am aware I should not use any illegal or recreational drugs. I agree not to drink alcohol unless my care team says I can.     I will tell my care team right away if I become pregnant, have a new medical problem treated outside of my regular clinic, or have a change in my medications.    I understand that this medicine can affect my thinking, judgment, and reaction time. Alcohol and drugs affect the brain and body, which can affect the safety of my driving. Being under the influence of alcohol or drugs can affect my decision-making, behaviors, personal safety, and the safety of others. Driving while impaired (DWI) can occur if a person is driving, operating, or in physical control of a car,  motorcycle, boat, snowmobile, ATV, motorbike, off-road vehicle, or any other motor vehicle ([place] Statute 169A.20). I understand the risk if I choose to drive or operate any vehicle or machinery.    I understand that if I do not follow any of the conditions above, my prescriptions or treatment may be stopped or changed.    I agree that my provider, clinic care team, and pharmacy may work with any city, state or federal law enforcement agency that investigates the misuse, sale, or other diversion of my controlled medicine. I will allow my provider to discuss my care with, or share a copy of, this agreement with any other treating provider, pharmacy, or emergency room where I receive care.    This agreement will remain in effect for duration of therapy and updated as appropriate, and/or  annually.    I have read this agreement and have asked questions about anything I did not understand.    __________________________________        ____________________________________  Patient     Date          Juan Richmond MD                     Date

## 2025-07-29 NOTE — PROGRESS NOTES
Clinic Care Coordination Contact  Clinic Care Coordination Contact  OUTREACH    Referral Information:  Referral Source: Other, specify    Primary Diagnosis: Dual -  MH/CD    Chief Complaint   Patient presents with    Clinic Care Coordination - Initial    Clinic Care Coordination - Face To Face        [place] Utilization: Yuniel comes in today for an appointment for the continuation of MOUD.  Was released from [facility] yesterday.  Was started on MOUD prior to his release date.  Last dose of Bup was 7/28/25 - 4mg daily.  States plan was to increase dose every 2 days until he gets to a maintenance dose of 16mg daily.  Has been on 4mg last few days with no adverse effects or return to use.  Yuniel would like to keep on plan of increasing every 2 days until he is at 16mg daily   Clinic Utilization  Difficulty keeping appointments:: No  Compliance Concerns: No  No-Show Concerns: No  No PCP office visit in Past Year: No  Utilization      No Show Count (past year)  0             ED Visits  1             Hospital Admissions  0                    Current as of: 7/29/2025 11:30 AM                Clinical Concerns:  Current Medical Concerns:  Continuation of MOUD    Current Behavioral Concerns: TBD - states he will be following with the [facility] - for mental health med management and counseling/therapy    Education Provided to patient: Opioid use disorder is a chronic disease.  MOUD is now the standard of care for the treatment of OUD.  Suboxone program and treatment compliance went over in detail with him.  Educated him on the mechanism of action of Suboxone - partial opioid agonist with a ceiling effect.   Must be in mild opioid withdrawal to start Suboxone - reduce the risk of precipitated withdrawal.  The goal is to eliminate withdrawal symptoms and cravings.  If the medication needs to be cut, have dry hands and use dry scissors.  Stressed the importance of allowing the medication to fully dissolve  underneath the tongue.  Suboxone will not work if it is swallowed.  Once medication is fully dissolved, advised him to rinse mouth out with water to help prevent any dental issues.  Did encourage no smoking for at least 15 minutes prior to medication dose.  Safe storage discussed - out of sight/reach of children.  Lost or stolen scripts will not be replaced.    Informed consent and treatment agreement signed.  Electronic communication form signed.  Narcan education provided and offered RX - which he declined, as he has some at home.  Risks of benzos/ETOH use discussed - increase risk of resp depression, risk for falls/injury, etc.  All questions answered.  Provided him with [employer]'s business card and work cell phone number and encouraged him to call/text with any questions/concerns/problems.      Pain  Pain (GOAL):: No  Health Maintenance Reviewed: Due/Overdue Will continue to work on these  Clinical Pathway: None    Medication Management:  Medication review status: Medications reviewed.  Changes noted per patient report.     Functional Status:  Dependent ADLs:: Independent  Dependent IADLs:: Independent  Bed or wheelchair confined:: No  Mobility Status: Independent    Living Situation:  Current living arrangement:: I live in a private home with family  [facility]    Lifestyle & Psychosocial Needs:    Social Drivers of Health     Food Insecurity: Unknown (7/29/2025)    Food Insecurity     Within the past 12 months, did you worry that your food would run out before you got money to buy more?: Patient declined     Within the past 12 months, did the food you bought just not last and you didn t have money to get more?: Patient declined   Depression: Not at risk (7/29/2025)    PHQ-2     PHQ-2 Score: 1   Housing Stability: Unknown (7/29/2025)    Housing Stability     Do you have housing? : Patient declined     Are you worried about losing your housing?: Patient declined   Tobacco Use: Medium  Risk (7/29/2025)    Patient History     Smoking Tobacco Use: Former     Smokeless Tobacco Use: Never     Passive Exposure: Current   Financial Resource Strain: Unknown (7/29/2025)    Financial Resource Strain     Within the past 12 months, have you or your family members you live with been unable to get utilities (heat, electricity) when it was really needed?: Patient declined   Alcohol Use: Alcohol Misuse (11/8/2018)    [facility]    AUDIT-C     Frequency of Alcohol Consumption: 2-3 times a week     Average Number of Drinks: 3 or 4     Frequency of Binge Drinking: Patient declined   Transportation Needs: Unknown (7/29/2025)    Transportation Needs     Within the past 12 months, has lack of transportation kept you from medical appointments, getting your medicines, non-medical meetings or appointments, work, or from getting things that you need?: Patient declined   Physical Activity: Not on file   Interpersonal Safety: Low Risk  (7/29/2025)    Interpersonal Safety     Do you feel physically and emotionally safe where you currently live?: Yes     Within the past 12 months, have you been hit, slapped, kicked or otherwise physically hurt by someone?: No     Within the past 12 months, have you been humiliated or emotionally abused in other ways by your partner or ex-partner?: No   Stress: Not on file   Social Connections: Not on file   Health Literacy: Not on file     Diet:: Regular  Inadequate nutrition (GOAL):: No  Tube Feeding: No  Inadequate activity/exercise (GOAL):: No  Significant changes in sleep pattern (GOAL): No  Transportation means:: Family     Pentecostal or spiritual beliefs that impact treatment:: No  Mental health DX:: Yes  Mental health DX how managed:: Outpatient Counseling  Mental health management concern (GOAL):: No  Chemical Dependency Status: Past Concern  Chemical Dependency Management: Previous treatment, Other (see comment)  Informal Support  system:: Family, Other          Resources and Interventions:  Current Resources:      Community Resources: Other (see comment)  Supplies Currently Used at Home: None  Equipment Currently Used at Home: none  Employment Status: employment seeking       Referrals Placed: None    Would individual benefit from a referral for additional services/resources?    Yes - Health-related social needs - Other - Please specify Locking Med Bag    Stage of Change: Maintenance  Reviewed information and resources for treatment and ongoing sobriety    Facilitated understanding the importance of awareness of factors that contribute to relapse , Assisted patient in identifying personal vulnerabilities, thoughts, emotions, and situations that may lead to relapse , Coached on skills to manage factors that contribute to relapse, Facilitated understanding of effective coping skills in response to triggers for substance use, and Assisted patient in identifying the challenges and barriers to participation and attendance to support groups/community resources      The patient consented via Written consent to have contact information and resources sent via text in an unencrypted manner.    Signed informed consent and treatment agreement and electronic communication form sent down to scanning.      Also had Yuniel sign an MONIKA to obtain records from [facility] - signed MONIKA faxed to [facility] fax number [phone].  Signed MONIKA sent down to scanning.       Care Plan:  Care Plan: Social Support       Problem: Inadequate social support       Goal: Improve social support system       This Visit's Progress: 40%    Note:     Barriers: Anxiety  Strengths: compliant  Patient expressed understanding of goal: yes  Action steps to achieve this goal:  1. I will attend scheduled appointments  2. I will work with [place]/band members  3. I will reach out if I am struggling                                Patient/Caregiver understanding: Yes    Outreach  Frequency: weekly, more frequently as needed  Future Appointments                In 1 week Juan Richmond MD [facility]            Plan: Start Suboxone as prescribed.  Follow up 1 week.    Carey Yo RN-BSN, [facility] Care Coordinator  [phone]

## 2025-07-29 NOTE — TELEPHONE ENCOUNTER
Liyah calling from [facility] stating the patient does not have any pharmacy benefits at this time because the patient was incarcerated and they do not have this medication in stock and won't be there until tomorrow might want to considered another pharmacy if the patient wants this medication today.    Phone number for Liyah at  [phone]

## 2025-07-30 NOTE — PROGRESS NOTES
"Records received from [facility] - which has information in regards to Yuniel's Financial Needs and Health Care Coverage.  It states, \"You currently have active health coverage through [facility].  Contact them at [phone] as soon as possible after you're released to confirm you've been released, they have your correct address, and your contact information.\"    RN CC relayed this information to [facility].    Records sent down to scanning.    Carey Yo RN-BSN, [facility] Care Coordinator  [phone]      "

## 2025-07-30 NOTE — PROGRESS NOTES
"ALANNA ARECHIGA received text from Yuniel at 2:57PM stating that he did receive a call back from the [facility] and his insurance should be updated by tomorrow morning \"for sure\".      RN CC thanked him for the update.    Carey Yo RN-BSN, [facility] Care Coordinator  [phone]      "

## 2025-07-30 NOTE — PROGRESS NOTES
Yuniel went to both pharmacies on 7/29/25 - [facility] and [facility] - and he was unable to  his Suboxone prescription - the pharmacies kept getting an error code back.  Dr. Richmond did sign [facility] form stating that Yuniel could pay cash for RX in the meantime, which the pharmacies would not allow.  Signed form sent down to [facility].  RN CC did reach out to [facility] and Yuniel was supposed to contact the [facility] once he was released from [facility] and update the [facility] on his living situation.  Once that has been completed, the insurance will lift the [facility] hold on his insurance.  ALANNA ARECHIGA notified Yuniel of this and provided him with phone numbers to call - [phone] and [phone].      RN CC check in with Yuniel on 7/30/25 via text and asked if he was able to get this issue taken care of.  Yuniel stated that he did call the [facility] and he left a message.  He is just waiting for a return phone call.  RN CC requested that he keep care team updated on this.    Carey Yo RN-BSN, [facility] Care Coordinator  [phone]

## 2025-08-04 ENCOUNTER — PATIENT OUTREACH (OUTPATIENT)
Dept: CARE COORDINATION | Facility: OTHER | Age: 35
End: 2025-08-04

## 2025-08-19 ENCOUNTER — PATIENT OUTREACH (OUTPATIENT)
Dept: CARE COORDINATION | Facility: OTHER | Age: 35
End: 2025-08-19

## 2025-08-20 ENCOUNTER — PATIENT OUTREACH (OUTPATIENT)
Dept: CARE COORDINATION | Facility: OTHER | Age: 35
End: 2025-08-20

## 2025-08-25 ENCOUNTER — PATIENT OUTREACH (OUTPATIENT)
Dept: CARE COORDINATION | Facility: OTHER | Age: 35
End: 2025-08-25

## 2025-08-26 ENCOUNTER — PATIENT OUTREACH (OUTPATIENT)
Dept: CARE COORDINATION | Facility: OTHER | Age: 35
End: 2025-08-26

## 2025-08-26 ENCOUNTER — APPOINTMENT (OUTPATIENT)
Dept: LAB | Facility: OTHER | Age: 35
End: 2025-08-26
Attending: STUDENT IN AN ORGANIZED HEALTH CARE EDUCATION/TRAINING PROGRAM
Payer: MEDICAID

## 2025-08-26 ENCOUNTER — OFFICE VISIT (OUTPATIENT)
Dept: FAMILY MEDICINE | Facility: OTHER | Age: 35
End: 2025-08-26
Attending: STUDENT IN AN ORGANIZED HEALTH CARE EDUCATION/TRAINING PROGRAM
Payer: MEDICAID

## 2025-08-26 VITALS
OXYGEN SATURATION: 98 % | TEMPERATURE: 97.6 F | HEIGHT: 72 IN | HEART RATE: 71 BPM | DIASTOLIC BLOOD PRESSURE: 80 MMHG | WEIGHT: 177.7 LBS | BODY MASS INDEX: 24.07 KG/M2 | SYSTOLIC BLOOD PRESSURE: 132 MMHG | RESPIRATION RATE: 16 BRPM

## 2025-08-26 DIAGNOSIS — F41.9 ANXIETY: ICD-10-CM

## 2025-08-26 DIAGNOSIS — L01.00 IMPETIGO: ICD-10-CM

## 2025-08-26 DIAGNOSIS — K59.03 DRUG-INDUCED CONSTIPATION: ICD-10-CM

## 2025-08-26 DIAGNOSIS — B18.2 CHRONIC HEPATITIS C WITHOUT HEPATIC COMA (H): ICD-10-CM

## 2025-08-26 DIAGNOSIS — F11.90 OPIOID USE DISORDER: Primary | ICD-10-CM

## 2025-08-26 LAB
AMPHETAMINES UR QL SCN: NORMAL
BARBITURATES UR QL SCN: NORMAL
BENZODIAZ UR QL SCN: NORMAL
BUPRENORPHINE UR QL: ABNORMAL
BZE UR QL SCN: NORMAL
CANNABINOIDS UR QL SCN: NORMAL
CREAT UR-MCNC: 202.3 MG/DL
ETHANOL UR QL SCN: NORMAL
FENTANYL UR QL: NORMAL
METHADONE UR QL SCN: NORMAL
OPIATES UR QL SCN: NORMAL
OXYCODONE UR QL: NORMAL
PCP QUAL URINE (ROCHE): NORMAL

## 2025-08-26 PROCEDURE — 80307 DRUG TEST PRSMV CHEM ANLYZR: CPT | Mod: ZL | Performed by: STUDENT IN AN ORGANIZED HEALTH CARE EDUCATION/TRAINING PROGRAM

## 2025-08-26 PROCEDURE — 82570 ASSAY OF URINE CREATININE: CPT | Mod: ZL | Performed by: STUDENT IN AN ORGANIZED HEALTH CARE EDUCATION/TRAINING PROGRAM

## 2025-08-26 PROCEDURE — G0463 HOSPITAL OUTPT CLINIC VISIT: HCPCS

## 2025-08-26 RX ORDER — MUPIROCIN 2 %
OINTMENT (GRAM) TOPICAL 3 TIMES DAILY
Qty: 60 G | Refills: 0 | Status: SHIPPED | OUTPATIENT
Start: 2025-08-26

## 2025-08-26 RX ORDER — BUPRENORPHINE HYDROCHLORIDE AND NALOXONE HYDROCHLORIDE DIHYDRATE 8; 2 MG/1; MG/1
1 TABLET SUBLINGUAL DAILY
Qty: 15 TABLET | Refills: 0 | Status: SHIPPED | OUTPATIENT
Start: 2025-08-26

## 2025-08-26 RX ORDER — POLYETHYLENE GLYCOL 3350 17 G/17G
1 POWDER, FOR SOLUTION ORAL DAILY
Qty: 578 G | Refills: 3 | Status: SHIPPED | OUTPATIENT
Start: 2025-08-26

## 2025-08-26 RX ORDER — ESCITALOPRAM OXALATE 10 MG/1
TABLET ORAL
Qty: 21 TABLET | Refills: 0 | Status: SHIPPED | OUTPATIENT
Start: 2025-08-26 | End: 2025-09-23

## (undated) RX ORDER — KETOROLAC TROMETHAMINE 30 MG/ML
INJECTION, SOLUTION INTRAMUSCULAR; INTRAVENOUS
Status: DISPENSED
Start: 2019-02-17

## (undated) RX ORDER — CLINDAMYCIN HCL 150 MG
CAPSULE ORAL
Status: DISPENSED
Start: 2019-02-17

## (undated) RX ORDER — LIDOCAINE HYDROCHLORIDE 10 MG/ML
INJECTION, SOLUTION EPIDURAL; INFILTRATION; INTRACAUDAL; PERINEURAL
Status: DISPENSED
Start: 2019-02-17